# Patient Record
Sex: FEMALE | Race: WHITE | NOT HISPANIC OR LATINO | Employment: OTHER | URBAN - METROPOLITAN AREA
[De-identification: names, ages, dates, MRNs, and addresses within clinical notes are randomized per-mention and may not be internally consistent; named-entity substitution may affect disease eponyms.]

---

## 2024-05-07 NOTE — PROGRESS NOTES
HEARING AID CHECK    RIGHT: *** SN: ***  : ***  Wax Guard/Dome: ***  LEFT: *** SN: ***  : ***  Wax Guard/Dome: ***    Repair Warranty: ***  L&D Warranty: ***    Kim Matthew was seen for a hearing aid check following ENT and audiometric evaluation appts.  Listening check revealed {GOOD/FAIR/POOR:73581} working function of devices.  Hearing aids were cleaned and {[facility]:21945}.  Patient satisfied.    {hacprice:88663}    RECOMMENDATIONS:  -Recommend patient return in ~6 months or sooner should concerns arise.    Beto Delgado, CCC-A    Appt time: *** - ***

## 2024-05-16 ENCOUNTER — APPOINTMENT (OUTPATIENT)
Dept: AUDIOLOGY | Facility: CLINIC | Age: 84
End: 2024-05-16
Payer: MEDICARE

## 2024-05-23 NOTE — PROGRESS NOTES
COMPREHENSIVE AUDIOMETRIC EVALUATION      Name:  iKm Matthew  :  1940  Age:  83 y.o.  Date of Evaluation:  24   Referring Provider:  SELF    History:  Ms. Matthew was seen today for an evaluation of hearing.  Patient reported concerns for decreased hearing sensitivity.  Patient reported medical history significant for a past right tympanic membrane perforation and otorrhea.  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 Patient utilizes hearing aids, binaurally. When asked, patient denied otalgia    See audiometric evaluation at end of this report or scanned under media tab    OTOSCOPY:       Right Ear: Clear canal with otorrhea visualized       Left Ear: Clear canal    226 Hz TYMPANOMETRY:       Right Ear: Type B: Flat with normal ear canal volume, consistent with middle ear involvement       Left Ear: Type B: Flat with normal ear canal volume, consistent with middle ear involvement    AUDIOMETRIC EVALUATION (Phones):       Right Ear: Profound, Mixed hearing loss           Left Ear: Moderate to moderately severe through 4000 Hz sloping to Severe, Mixed hearing loss           NOTE: Could not mask for left bone due to severity of patient's hearing loss in the right ear; Decreased hearing sensitivity as compared to 2015 evaluation    Test technique:  Standard Audiometry  Reliability:   good    SPEECH RECOGNITION THRESHOLD:       Right Ear:  95 dBHL in good agreement with PTA       Left Ear:  60 dBHL in good agreement with PTA    WORD RECOGNITION:       Right Ear:  poor (60%) at elevated presentation level; NOTE: Although elevated presentation level, reduced sensation level; true word recognition likely better than indicated       Left Ear:  excellent (100%) at elevated presentation level    DISCUSSION:   Discussed results and recommendations with patient.  Questions were addressed and the patient was encouraged to contact our department should concerns  arise.    RECOMMENDATIONS:  -Recommend patient referral to ENT provider due to significant mixed component and right otorrhea  -Following medical management, recommend patient return to Medicaid provider for reprogramming of hearing aids.    HEARING AID CHECK     RIGHT: [employer] -[facility] SN: [id number]  : needs updated  Wax Guard/Dome: [employer], large power  LEFT: [employer] -[facility] SN: [id number]  : needs updated  Wax Guard/Dome: [employer], large power     Repair Warranty: obtained at outside site, unknown  L&D Warranty: obtained at outside site, unknown     Kim Matthew was seen for a hearing aid check following audiometric evaluation appt.  Listening check revealed fair working function of devices.  Hearing aids were cleaned and domes and wax guards changed and devices. Hearing aids were reprogrammed to today's audiometric evaluation and gain of the right hearing aid was turned up. Discussed with patient that with her current acoustic parameters we are at the limits of her right hearing aid. After ENT provider, patient should have hearing sensitivity re-evaluated and hearing aids reprogrammed. Patient satisfied.     $35 for hearing aid check with programming    NOTE: Although patient was notified of cost at the beginning of appointment, she did not make us aware of Medicaid insurance until after services rendered.  Discussed with patient that we are not in network with Medicaid for hearing aids and provided her list of Medicaid hearing aid providers.     RECOMMENDATIONS:  -Recommend patient return in ~6 months or sooner should concerns arise.     Beto Delgado, CCC-A     Appt time: 9:00 - 10:00 AM

## 2024-05-24 ENCOUNTER — CLINICAL SUPPORT (OUTPATIENT)
Dept: AUDIOLOGY | Facility: CLINIC | Age: 84
End: 2024-05-24
Payer: MEDICARE

## 2024-05-24 DIAGNOSIS — H69.93 DYSFUNCTION OF BOTH EUSTACHIAN TUBES: ICD-10-CM

## 2024-05-24 DIAGNOSIS — H90.6 MIXED CONDUCTIVE AND SENSORINEURAL HEARING LOSS OF BOTH EARS: Primary | ICD-10-CM

## 2024-05-24 PROCEDURE — 92567 TYMPANOMETRY: CPT | Performed by: AUDIOLOGIST

## 2024-05-24 PROCEDURE — 92557 COMPREHENSIVE HEARING TEST: CPT | Performed by: AUDIOLOGIST

## 2024-08-19 ENCOUNTER — APPOINTMENT (OUTPATIENT)
Dept: OTOLARYNGOLOGY | Facility: CLINIC | Age: 84
End: 2024-08-19
Payer: MEDICARE